# Patient Record
Sex: MALE | Race: BLACK OR AFRICAN AMERICAN | NOT HISPANIC OR LATINO | URBAN - METROPOLITAN AREA
[De-identification: names, ages, dates, MRNs, and addresses within clinical notes are randomized per-mention and may not be internally consistent; named-entity substitution may affect disease eponyms.]

---

## 2019-05-13 ENCOUNTER — EMERGENCY (EMERGENCY)
Facility: HOSPITAL | Age: 34
LOS: 1 days | Discharge: HOME | End: 2019-05-13
Admitting: EMERGENCY MEDICINE
Payer: MEDICAID

## 2019-05-13 VITALS
OXYGEN SATURATION: 100 % | RESPIRATION RATE: 16 BRPM | HEART RATE: 61 BPM | TEMPERATURE: 98 F | SYSTOLIC BLOOD PRESSURE: 119 MMHG | DIASTOLIC BLOOD PRESSURE: 62 MMHG

## 2019-05-13 PROCEDURE — 99283 EMERGENCY DEPT VISIT LOW MDM: CPT

## 2019-05-13 RX ORDER — DIPHENHYDRAMINE HCL 50 MG
50 CAPSULE ORAL ONCE
Refills: 0 | Status: COMPLETED | OUTPATIENT
Start: 2019-05-13 | End: 2019-05-13

## 2019-05-13 RX ORDER — HYDROCORTISONE 1 %
1 OINTMENT (GRAM) TOPICAL ONCE
Refills: 0 | Status: COMPLETED | OUTPATIENT
Start: 2019-05-13 | End: 2019-05-13

## 2019-05-13 RX ADMIN — Medication 1 APPLICATION(S): at 17:44

## 2019-05-13 RX ADMIN — Medication 50 MILLIGRAM(S): at 17:44

## 2019-05-13 NOTE — ED PROVIDER NOTE - NSFOLLOWUPINSTRUCTIONS_ED_ALL_ED_FT
For area on left hand and right temple:  Take benadryl 25mg up to three times a day for itching  Apply thin layer of hydrocortisone cream over affected area twice a day for 5 days    For areas on arms:  Apply clotrimazole cream twice daily for 3 weeks  Follow up with dermatology

## 2019-05-13 NOTE — ED PROVIDER NOTE - CARE PLAN
Principal Discharge DX:	Contact dermatitis, unspecified contact dermatitis type, unspecified trigger  Secondary Diagnosis:	Ringworm of body

## 2019-05-13 NOTE — ED PROVIDER NOTE - NS ED ROS FT
Constitutional: No fever. No chills.  Eyes: No redness. No discharge. No vision change.   ENT: No sore throat. No ear pain.  Cardiovascular: No chest pain. No leg swelling.  Respiratory: No cough. No shortness of breath.  GI: No abdominal pain. No vomiting. No diarrhea.   MSK: No joint pain. No back pain.   Skin: +rash. No abrasions.   Neuro: No numbness. No weakness.   Psych: No known mental health issues.

## 2019-05-13 NOTE — ED PROVIDER NOTE - CLINICAL SUMMARY MEDICAL DECISION MAKING FREE TEXT BOX
ED course unremarkable - afebrile and hemodynamically stable. New lesions appear c/w allergic contact dermatitis. Will give benadryl 50mg po for itching and start on hydrocortisone 1% cream. Lesions on upper extremities appear c/w tinea corporis. Will start on clotrimazole cream bid x3 wk with dermatology follow up. Discussed plan with patient who verbalizes understanding. Return precautions given.

## 2019-05-13 NOTE — ED PROVIDER NOTE - OBJECTIVE STATEMENT
34yo otherwise healthy male presents with itchy red lesions to left hand and right face today. Pt states he woke up and noticed itchy, red area on his left hand and over his right temple. He denies known insect bite. He is currently treating bedbug bites on his forehead sustained at a friend's house 4 days ago. He has been cleaning area at home and washed all clothing and bedding. He also reports well circumscribed itchy scaled lesions over bilateral arms x months. He denies oral swelling, difficulty swallowing, difficulty breathing, wheezing. He has no known allergies.

## 2019-05-13 NOTE — ED PROVIDER NOTE - PHYSICAL EXAMINATION
VITAL SIGNS: I have reviewed nursing notes and confirm.  CONSTITUTIONAL: Well-developed; well-nourished; in no acute distress.   SKIN:  warm and dry. 2cm erythematous lesion over dorsal aspect of left hand and 2 cm erythematous lesions over right temple. No fluctuance. No ulceration. Scattered erythematous papules over forehead. 2 well circumscribed erythematous scaled lesions over left arm and one well circumscribed erythematous scaled lesion over right arm.   HEAD:  normocephalic, atraumatic.  EYES: PERRL, EOM intact; conjunctiva and sclera clear.  ENT: No nasal discharge; airway clear.   NECK: Supple; non tender.  EXT: Normal ROM. No clubbing, cyanosis or edema. 2+ pulses to b/l ue/le.  NEURO: Alert, oriented, grossly unremarkable  PSYCH: Cooperative, mood and affect appropriate.

## 2019-05-13 NOTE — ED ADULT NURSE NOTE - OBJECTIVE STATEMENT
Pt presents with R face and L hand itchiness/redness. Pt denies any known insect bite. States he is currently being treated for bedbug bites from his friends house 4 days ago. Denies any swelling, difficulty breathing or known allergies.

## 2019-05-17 DIAGNOSIS — L25.9 UNSPECIFIED CONTACT DERMATITIS, UNSPECIFIED CAUSE: ICD-10-CM

## 2019-05-17 DIAGNOSIS — L29.9 PRURITUS, UNSPECIFIED: ICD-10-CM

## 2019-05-17 DIAGNOSIS — B35.4 TINEA CORPORIS: ICD-10-CM

## 2023-04-27 NOTE — ED ADULT NURSE NOTE - PERIPHERAL VASCULAR
You can access the FollowMyHealth Patient Portal offered by [facility] by registering at the following website: [email or web address]. By joining [employer]’s FollowMyHealth portal, you will also be able to view your health information using other applications (apps) compatible with our system.
WDL